# Patient Record
Sex: FEMALE | Race: WHITE | HISPANIC OR LATINO | URBAN - METROPOLITAN AREA
[De-identification: names, ages, dates, MRNs, and addresses within clinical notes are randomized per-mention and may not be internally consistent; named-entity substitution may affect disease eponyms.]

---

## 2019-06-13 ENCOUNTER — EMERGENCY (EMERGENCY)
Facility: HOSPITAL | Age: 44
LOS: 1 days | End: 2019-06-13
Admitting: EMERGENCY MEDICINE
Payer: COMMERCIAL

## 2019-06-13 PROCEDURE — 73030 X-RAY EXAM OF SHOULDER: CPT | Mod: 26,LT

## 2019-06-13 PROCEDURE — 99283 EMERGENCY DEPT VISIT LOW MDM: CPT

## 2019-12-03 ENCOUNTER — EMERGENCY (EMERGENCY)
Facility: HOSPITAL | Age: 44
LOS: 1 days | End: 2019-12-03
Admitting: EMERGENCY MEDICINE
Payer: COMMERCIAL

## 2019-12-03 PROCEDURE — 99283 EMERGENCY DEPT VISIT LOW MDM: CPT

## 2019-12-04 ENCOUNTER — EMERGENCY (EMERGENCY)
Facility: HOSPITAL | Age: 44
LOS: 1 days | End: 2019-12-04
Admitting: EMERGENCY MEDICINE
Payer: COMMERCIAL

## 2019-12-04 PROCEDURE — 99284 EMERGENCY DEPT VISIT MOD MDM: CPT

## 2019-12-09 ENCOUNTER — APPOINTMENT (OUTPATIENT)
Dept: OBGYN | Facility: CLINIC | Age: 44
End: 2019-12-09
Payer: COMMERCIAL

## 2019-12-09 VITALS
DIASTOLIC BLOOD PRESSURE: 72 MMHG | BODY MASS INDEX: 23.92 KG/M2 | WEIGHT: 130 LBS | SYSTOLIC BLOOD PRESSURE: 116 MMHG | HEIGHT: 62 IN

## 2019-12-09 DIAGNOSIS — N63.10 UNSPECIFIED LUMP IN THE RIGHT BREAST, UNSPECIFIED QUADRANT: ICD-10-CM

## 2019-12-09 DIAGNOSIS — Z80.8 FAMILY HISTORY OF MALIGNANT NEOPLASM OF OTHER ORGANS OR SYSTEMS: ICD-10-CM

## 2019-12-09 DIAGNOSIS — Z82.49 FAMILY HISTORY OF ISCHEMIC HEART DISEASE AND OTHER DISEASES OF THE CIRCULATORY SYSTEM: ICD-10-CM

## 2019-12-09 DIAGNOSIS — Z80.0 FAMILY HISTORY OF MALIGNANT NEOPLASM OF DIGESTIVE ORGANS: ICD-10-CM

## 2019-12-09 DIAGNOSIS — Z01.419 ENCOUNTER FOR GYNECOLOGICAL EXAMINATION (GENERAL) (ROUTINE) W/OUT ABNORMAL FINDINGS: ICD-10-CM

## 2019-12-09 PROBLEM — Z00.00 ENCOUNTER FOR PREVENTIVE HEALTH EXAMINATION: Status: ACTIVE | Noted: 2019-12-09

## 2019-12-09 PROCEDURE — 99386 PREV VISIT NEW AGE 40-64: CPT

## 2019-12-10 PROBLEM — Z82.49 FAMILY HISTORY OF HYPERTENSION: Status: ACTIVE | Noted: 2019-12-09

## 2019-12-10 PROBLEM — Z80.0 FAMILY HISTORY OF MALIGNANT NEOPLASM OF COLON: Status: ACTIVE | Noted: 2019-12-09

## 2019-12-10 PROBLEM — Z80.8 FAMILY HISTORY OF MALIGNANT NEOPLASM OF THYROID: Status: ACTIVE | Noted: 2019-12-09

## 2019-12-10 LAB — HPV HIGH+LOW RISK DNA PNL CVX: NOT DETECTED

## 2019-12-10 NOTE — PROCEDURE
[Liquid Base] : liquid base [Vaginal Pap Smear] : vaginal Pap smear [Tolerated Well] : the patient tolerated the procedure well [No Complications] : there were no complications

## 2019-12-10 NOTE — PHYSICAL EXAM
[Awake] : awake [Acute Distress] : no acute distress [Alert] : alert [Thyroid Nodule] : no thyroid nodule [LAD] : no lymphadenopathy [Mass] : breast mass [Goiter] : no goiter [Nipple Discharge] : no nipple discharge [Axillary LAD] : no axillary lymphadenopathy [___] : no erythema [Examination Of The Breasts] : a normal appearance [Tenderness Of The Right Breast] : no tenderness [___cm] : a ~M [unfilled] ~Ucm inferior lateral quadrant mass was palpated [Enlargement Of The Right Breast] : no swelling [Firm] : firm [Mobile] : mobile [Superficial] : superficial [] : in the 8:00 position [Nontender] : nontender [Tender] : non tender [Soft] : soft [Distended] : not distended [Depressed Mood] : not depressed [Oriented x3] : oriented to person, place, and time [H/Smegaly] : no hepatosplenomegaly [Flat Affect] : affect not flat [Labia Majora] : labia major [Normal] : clitoris [Labia Minora] : labia minora [Pap Obtained] : a Pap smear was performed [No Bleeding] : there was no active vaginal bleeding [Absent] : absent [Uterine Adnexae] : were not tender and not enlarged [FreeTextEntry5] : vaginal sample taken, total hysterectomy [CTAB] : CTAB [RRR, No Murmurs] : RRR, no murmurs

## 2019-12-10 NOTE — CHIEF COMPLAINT
[Annual Visit] : annual visit [FreeTextEntry1] : [place] [facility] [place]. Pt was treated with IV and PO antibiotics. Has resolved.

## 2019-12-16 LAB — CYTOLOGY CVX/VAG DOC THIN PREP: NORMAL

## 2020-12-21 PROBLEM — Z01.419 ENCOUNTER FOR ROUTINE GYNECOLOGICAL EXAMINATION: Status: RESOLVED | Noted: 2019-12-09 | Resolved: 2020-12-21

## 2023-10-23 ENCOUNTER — RESULT CHARGE (OUTPATIENT)
Age: 48
End: 2023-10-23

## 2023-10-24 ENCOUNTER — APPOINTMENT (OUTPATIENT)
Dept: CARDIOLOGY | Facility: CLINIC | Age: 48
End: 2023-10-24
Payer: COMMERCIAL

## 2023-10-24 VITALS
HEIGHT: 62 IN | BODY MASS INDEX: 23.55 KG/M2 | DIASTOLIC BLOOD PRESSURE: 82 MMHG | OXYGEN SATURATION: 99 % | WEIGHT: 128 LBS | SYSTOLIC BLOOD PRESSURE: 108 MMHG | HEART RATE: 67 BPM

## 2023-10-24 VITALS — SYSTOLIC BLOOD PRESSURE: 110 MMHG | DIASTOLIC BLOOD PRESSURE: 80 MMHG

## 2023-10-24 DIAGNOSIS — R55 SYNCOPE AND COLLAPSE: ICD-10-CM

## 2023-10-24 DIAGNOSIS — Z78.9 OTHER SPECIFIED HEALTH STATUS: ICD-10-CM

## 2023-10-24 DIAGNOSIS — Z82.49 FAMILY HISTORY OF ISCHEMIC HEART DISEASE AND OTHER DISEASES OF THE CIRCULATORY SYSTEM: ICD-10-CM

## 2023-10-24 DIAGNOSIS — Z83.3 FAMILY HISTORY OF DIABETES MELLITUS: ICD-10-CM

## 2023-10-24 DIAGNOSIS — Z87.891 PERSONAL HISTORY OF NICOTINE DEPENDENCE: ICD-10-CM

## 2023-10-24 DIAGNOSIS — R94.31 ABNORMAL ELECTROCARDIOGRAM [ECG] [EKG]: ICD-10-CM

## 2023-10-24 DIAGNOSIS — Z87.410 PERSONAL HISTORY OF CERVICAL DYSPLASIA: ICD-10-CM

## 2023-10-24 DIAGNOSIS — I95.9 HYPOTENSION, UNSPECIFIED: ICD-10-CM

## 2023-10-24 PROCEDURE — 93000 ELECTROCARDIOGRAM COMPLETE: CPT

## 2023-10-24 PROCEDURE — 99204 OFFICE O/P NEW MOD 45 MIN: CPT

## 2023-10-24 RX ORDER — MULTIVITAMIN
TABLET ORAL
Refills: 0 | Status: ACTIVE | COMMUNITY

## 2023-10-24 RX ORDER — VITAMIN E (DL,TOCOPHERYL ACET) 180 MG
CAPSULE ORAL
Refills: 0 | Status: ACTIVE | COMMUNITY

## 2023-10-24 RX ORDER — DEXAMETHASONE 4 MG/1
500 TABLET ORAL
Refills: 0 | Status: DISCONTINUED | COMMUNITY
End: 2023-10-24

## 2023-10-25 ENCOUNTER — NON-APPOINTMENT (OUTPATIENT)
Age: 48
End: 2023-10-25

## 2023-10-25 PROBLEM — I95.9 LOW BLOOD PRESSURE: Status: ACTIVE | Noted: 2023-10-25

## 2023-10-25 PROBLEM — R94.31 NONSPECIFIC ABNORMAL ELECTROCARDIOGRAM (ECG) (EKG): Status: ACTIVE | Noted: 2023-10-25

## 2023-12-23 NOTE — HISTORY OF PRESENT ILLNESS
[FreeTextEntry1] : JUAN JOSE GLYNN  is a 48 year old  F   Initial cardiology consultation. Relatively healthy. No recent medical evaluation.  Seen in follow-up from [facility].  She had passed out at home with associated head trauma.  She reports feeling lightheaded and fatigued. She does not recall the fall. Her family called 911. She reportedly lost consciousness for minutes? She was brought to [place] [facility] where she was observed on the monitor had blood work and a CT scan.  She reports baseline low blood pressure.  There is no prior history of a clinical myocardial infarction, coronary revascularization. There is no history of symptomatic congestive heart failure rheumatic heart disease or valvular disease. There is no history of symptomatic arrhythmias including atrial fibrillation.  There is no exertional chest pain, pressure or discomfort. There is no significant dyspnea on exertion or orthopnea. There are no symptomatic palpitations  Family history of cardiovascular disease.  Father with prior bypass surgery. Mother with atrial fibrillation.   Works as a local .   EKG demonstrates normal sinus rhythm.   ER record reports got up off couch, felt dizzy went down. Struck occiput. Subsequent headache. Blood pressure within normal limits on arrival to ER. Given IV fluids. Blood work with hemoglobin 12.2, creatinine 0.6, troponin negative, head CT with soft tissue injury without intracranial abnormality or calvarial fracture. Outside EKG has nonspecific ST changes.  Discharged from the emergency room.

## 2023-12-23 NOTE — ASSESSMENT
[FreeTextEntry1] :  Syncope. Does not have typical prodrome. There is low baseline blood pressure.  Recommend echocardiogram exercise test carotid Doppler and monitor.  Blood work. Emphasized importance of adequate fluid and sodium intake.  Instructed to notify our office if any further symptoms.  Discussed routine medical care for health maintenance  Reviewed the pathophysiology of vasovagal syncope. Instructed to avoid triggers such as prolonged standing, heat exposure, alcohol or dehydration and to assume a supine position with legs raised at the onset of symptoms to avoid a traumatic fall. Educated on counterpressure maneuvers such as tensing the arms with clenched fists to reduce lower extremity venous pooling and potentially abort a syncopal episode.

## 2023-12-29 ENCOUNTER — APPOINTMENT (OUTPATIENT)
Dept: CARDIOLOGY | Facility: CLINIC | Age: 48
End: 2023-12-29